# Patient Record
Sex: MALE | Race: WHITE
[De-identification: names, ages, dates, MRNs, and addresses within clinical notes are randomized per-mention and may not be internally consistent; named-entity substitution may affect disease eponyms.]

---

## 2018-10-29 ENCOUNTER — HOSPITAL ENCOUNTER (OUTPATIENT)
Dept: HOSPITAL 76 - SC | Age: 68
Discharge: HOME | End: 2018-10-29
Attending: INTERNAL MEDICINE
Payer: MEDICARE

## 2018-10-29 DIAGNOSIS — G47.33: Primary | ICD-10-CM

## 2018-10-29 PROCEDURE — 99212 OFFICE O/P EST SF 10 MIN: CPT

## 2018-10-29 PROCEDURE — 99213 OFFICE O/P EST LOW 20 MIN: CPT

## 2021-06-09 ENCOUNTER — HOSPITAL ENCOUNTER (OUTPATIENT)
Dept: HOSPITAL 76 - SC | Age: 71
Discharge: HOME | End: 2021-06-09
Attending: NURSE PRACTITIONER
Payer: MEDICARE

## 2021-06-09 DIAGNOSIS — G47.33: Primary | ICD-10-CM

## 2021-06-09 NOTE — SLEEP CARE CONSULTATION
Information from patient questionnaire entered by Juan Cuevas.





I have reviewed and concur with the information entered by Juan Cuevas. This 

document represents the service I personally performed and the decisions made by

me, Linda Sifuentes ARNP.





History of Present Illness


Service Date and Time: 2021    1642


Previous diagnosis: Mild, Obstructive Sleep Apnea-Hypopnea Syndrome


AHI: 6.6 (in 2017)


Reason for follow up: annual


Equipment type: CPAP


Equipment obtained from: Other ([employer]; getting supplies as 

needed)


Mask style: Full face


Backup mask available: Yes (old mask)


Last cushion change: rotates through several 


Prior sleep studies: Yes


Year and Where: 2017 - [facility]


HPI additional information: 





BUDDY FISHER was diagnosed to have mild, AHI 6.6, obstructive sleep apnea-

hypopnea syndrome and returns via Telehealth visit today for CPAP therapy annual

follow-up.





CPAP Compliance Data





- Data Reviewed with Patient


Average duration of nightly device use: 9 h 19 min


Compliance rate %: 99.4


Current pressure setting (cmH2O): 7-10


Humidity setting: 3


Heated hose settin


Average residual AHI: 1.1


Average large leak: 4 min 11 sec





Subjective


Patient concerns: reports: mask leak noise (occasional, just tightens headgear 

and it resolves), dry mouth, nose, throat (sometimes).  denies: aerophagia, mask

discomfort, air blowing in eyes, condensation in mask/hose, nasal congestion, 

epistaxis, other


Observed to snore while using device: No


Current pressure setting perceived as: comfortable


On therapy, patient: reports: sleeping better, awakening more refreshed, being 

more awake and alert during the day, more rested overall.  denies: drowsiness 

while driving


Initial Hernandez Sleepiness Scale score: 13 (in 2017)


Current Hernandez Sleepiness Scale score: 6





Allergies and Home Medications


Home medication list reviewed: Yes (no new meds)





Review of Systems


Review of systems same as previous: Yes (no changes)





Physical Exam


Vital signs obtained and entered by: Telehealth visit to reduce exposure during 

Covid pandemic


Height: 5 ft 7 in





Impression and Plan





1. Obstructive Sleep Apnea-Hypopnea Syndrome, mild, with good treatment 

compliance and good apnea control. On CPAP therapy, the patient has better sleep

quality and is more rested overall. Patient has been getting his supplies from a

mail order DME company.  He would like to see if he can find one closer to his 

area.  [facility]

. I discussed with him that we do have a list of DME's in the area and he 

requested a list of the be sent to him by mail.  He will let us know if he wants

transfer to one of those on his list. No other changes needed today.  He has no 

issues or concerns with skin irritation, aerophagia, epistaxis, nasal congestion

or significant oral dryness. Patient's apnea severity and rationale for 

treatment to reduce apnea, improve sleep quality and reduce cardiovascular and 

cerebrovascular events was reviewed. I also reviewed the benefit of consistent 

device use of CPAP for hypertension, cardiac disease,  and gastric reflux.





* Continue auto CPAP pressure at 7-10 cmH2O


* Notify me if snoring with mask or feeling that the pressure is too much or too

  little


* Attempt to lose weight


* Call this office if any problems using CPAP


* Return for follow up in 1 year, or sooner if concerns arise





Counseling Topics: Weight loss health impact


Visit Type: Telehealth Video


Video Type: VSee


Patient Location: Home


Location of Provider: Office


Patient agrees and consents to this telehealth visit type: Yes


Patient agrees to have their insurance billed: Yes


Time Spent with Patient (minutes): 18


Provider Statement: I spent 100% of the Telehealth Video Call with the patient 

with greater than 50% spent counseling the patient and coordination of care.

## 2022-07-08 ENCOUNTER — HOSPITAL ENCOUNTER (OUTPATIENT)
Dept: HOSPITAL 76 - SC | Age: 72
Discharge: HOME | End: 2022-07-08
Attending: NURSE PRACTITIONER
Payer: MEDICARE

## 2022-07-08 DIAGNOSIS — G47.33: Primary | ICD-10-CM

## 2022-07-08 DIAGNOSIS — E66.9: ICD-10-CM

## 2022-07-08 NOTE — SLEEP CARE CONSULTATION
Information from patient questionnaire entered by Kerri Mccabe MA.





I have reviewed and concur with the information entered by Kerri Mccabe MA. 

This document represents the service I personally performed and the decisions 

made by me, Linda Sifuentes ARNP.





History of Present Illness


Service Date and Time: 2022    1120


Previous diagnosis: Mild, Obstructive Sleep Apnea-Hypopnea Syndrome


AHI: 6.6 (in 2017)


Reason for follow up: annual (last seen 2021, wants RX new machine, )


Equipment type: CPAP


Equipment obtained from: Other ([employer]; getting supplies as 

needed)


Mask style: Full face


Mask brand: vocaltap & Carbonated Content


Backup mask available: Yes (other mask)


Last cushion change: circulates 14 masks every 2 weeks; started couple months 

ago


Prior sleep studies: Yes


Year and Where:  - [facility]


HPI additional information: 





BUDDY FISHER was diagnosed to have mild, AHI 6.6, obstructive sleep apnea-

hypopnea syndrome and returns via video telehealth visit today for CPAP therapy 

annual follow-up.





Sleep Study





- Results


Prior sleep studies: Yes


Year and Where: 2017 - [facility]





CPAP Compliance Data





- Data Reviewed with Patient


Average duration of nightly device use: 9 HOURS 26 MINUTES


Compliance rate %: 100 (90 days; 90/90 days used)


Current pressure setting (cmH2O): 7-10


Humidity setting: 3


Heated hose settin


Average residual AHI: 2.4


Average large leak: 33 SECONDS


Compliance data discussion: 





His machine was last updated on 2017.





Subjective


Patient concerns: reports: dry mouth, nose, throat (little bit).  denies: 

aerophagia, mask discomfort, air blowing in eyes, mask leak noise, condensation 

in mask/hose, nasal congestion, epistaxis, other


Observed to snore while using device: No


Current pressure setting perceived as: comfortable


On therapy, patient: reports: sleeping better, awakening more refreshed, being 

more awake and alert during the day, more rested overall.  denies: drowsiness 

while driving


Initial [place] Sleepiness Scale score: 13 (in 2017)


Current [place] Sleepiness Scale score: 11





Allergies and Home Medications


Home medication list reviewed: Yes (no changes)





Review of Systems


Review of systems same as previous: No (cyst removal last Wed)





Physical Exam


Vital signs obtained and entered by: DENISE MCCABE CMA AAMA


Height: 5 ft 7 in


Weight: 200 lb (pt reported)


Body Mass Index: 31.3


BMI Classification: Obese





Impression and Plan





1. Obstructive Sleep Apnea-Hypopnea Syndrome, mild, with good treatment 

compliance and good apnea control. On CPAP therapy, the patient has better sleep

quality and is more rested overall. Patient states he is having difficulty with 

an early awakening with inability to go back to sleep. He states about 3 hours 

after going to bed he will wake up to use the bathroom and then will lay in his 

bed for up to an hour trying to get back to sleep. He asked for any suggestions 

to help him get back to sleep at that time and denies any racing thoughts. He is

just laying there trying to go back to sleep.  I advised him to try getting out 

of bed and doing calming activities if not able to sleep after 15 to 20 minutes 

until sleepy. He may also try progressive relaxation by tensing and relaxing 

muscles from head to toe while concentrating on breathing which may help him 

fall back to sleep more easily. He states he will try these things and see if 

they will help him. Patient also asking about replacing his device since it is 

almost 5 years old before he leaves area for the winter months.  He last updated

his machine in 2017.  I told him he will be eligible in mid-September 

and if he will call at the first of the month I will write an order for a 

machine update before he leaves the area for the winter.  He voiced 

understanding and agreement with this plan of care. Patient's apnea severity and

rationale for treatment to reduce apnea, improve sleep quality and reduce 

cardiovascular and cerebrovascular events was reviewed. I also reviewed the 

benefit of consistent device use of CPAP for hypertension, cardiac disease  and 

gastric reflux.





* Continue auto CPAP pressure at 7-10 cmH2O


* Notify me if snoring with mask or feeling that the pressure is too much or too

  little


* Call this office if any problems using CPAP


* Return for follow up in 1 year, or sooner if concerns arise





Counseling Topics: Spare mask


Visit Type: Telehealth Video


Video Type: Doximity


Patient Location: Home


Location of Provider: Office


Patient agrees and consents to this telehealth visit type: Yes


Patient agrees to have their insurance billed: Yes


Time Spent with Patient (minutes): 23


Provider Statement: I spent 100% of the Telehealth Video Call with the patient 

with greater than 50% spent counseling the patient and coordination of care.

## 2023-10-05 ENCOUNTER — HOSPITAL ENCOUNTER (OUTPATIENT)
Dept: HOSPITAL 76 - SC | Age: 73
Discharge: HOME | End: 2023-10-05
Attending: NURSE PRACTITIONER
Payer: MEDICARE

## 2023-10-05 VITALS — DIASTOLIC BLOOD PRESSURE: 82 MMHG | SYSTOLIC BLOOD PRESSURE: 117 MMHG

## 2023-10-05 DIAGNOSIS — G47.33: Primary | ICD-10-CM

## 2023-10-05 DIAGNOSIS — E66.9: ICD-10-CM

## 2023-10-05 NOTE — SLEEP CARE CONSULTATION
Information from patient questionnaire entered by France Noonan.





I have reviewed and concur with the information entered by France Noonan. This 

document represents the service I personally performed and the decisions made by

me, Linda Sifuentes ARNP.





History of Present Illness


Service Date and Time: 10/05/2023    1300


Previous diagnosis: Mild, Obstructive Sleep Apnea-Hypopnea Syndrome


AHI: 6.6 (in 2017)


Reason for follow up: annual (LAST SEEN 07/2022)


Equipment type: CPAP (RESMED Airsense 11, s/u 10/2022)


Equipment obtained from: Other ([employer]; getting supplies as 

needed)


Mask style: Full face


Mask brand: [employer]ine[employer]ier (Simplus, medium cushion)


Backup mask available: Yes (old mask)


Last cushion change: last May, rotates through 14 cushions


Prior sleep studies: Yes


[facility]


HPI additional information: 





BUDDY FISHER was diagnosed to have mild, AHI 6.6, obstructive sleep apnea-

hypopnea syndrome and returns via video telehealth visit today for CPAP therapy 

annual follow-up.





Sleep Study





- Results


Prior sleep studies: Yes


[facility]





CPAP Compliance Data





- Data Reviewed with Patient


Average duration of nightly device use: 9 HRS 22 MINS


Compliance rate %: 95 (04/06/23-10/02/23; 172/180 days used)


Current pressure setting (cmH2O): 7-10


Average residual AHI: 0.9


Central apnea: 0


Obstructive apnea: 0.4


Hypopnea: 0.5


Average large leak: 0 L/min





Subjective


Missed days of use due to: reports: illness (oral surgery)


Patient concerns: reports: dry mouth, nose, throat (is a mouth breather).  

denies: aerophagia, mask discomfort, air blowing in eyes, mask leak noise, 

condensation in mask/hose, nasal congestion, epistaxis


Observed to snore while using device: Yes


Current pressure setting perceived as: comfortable


On therapy, patient: reports: sleeping better, awakening more refreshed, being 

more awake and alert during the day, more rested overall.  denies: drowsiness 

while driving


Initial [place] Sleepiness Scale score: 13 (in 2017)


Current [place] Sleepiness Scale score: 13 (10/05/23)





Allergies and Home Medications


Known drug allergies: No


Drug allergies reviewed: Yes


Home medication list reviewed: Yes (no changes)





Review of Systems


Review of systems same as previous: Yes (no changes)





Physical Exam


Vital signs obtained and entered by: FRANCE RAINEY MA


Blood Pressure: 117/82 (PER PT)


Height: 5 ft 7 in (PER PT)


Weight: 205 lb (PER PT)


Weight change since last visit: 5 lb gain?


Body Mass Index: 32.1


BMI Classification: Obese





Impression and Plan





1. Obstructive Sleep Apnea-Hypopnea Syndrome, mild, with good treatment 

compliance and good apnea control. On CPAP therapy, the patient has better sleep

quality and is more rested overall. Patient has significant improvement of their

sleep apnea and is satisfied with current CPAP therapy.  He states he does get 

some oral dryness but knows that he can breathe through his nose well and is 

sure this is part of the problem.  He did try oral moisturizers at one time but 

he states is really not a problem. We will update his supply prescription and 

see him next year for follow-up. Patient's apnea severity and rationale for 

treatment to reduce apnea, improve sleep quality and reduce cardiovascular and 

cerebrovascular events was reviewed. I also reviewed the benefit of consistent 

device use of CPAP for hypertension, cardiac disease and gastric reflux.





2. Obesity, unspecified. Currently patients BMI is 32.1.  Obesity increases the

risk of apnea, CPAP pressure requirements and overall health risks especially 

cardiovascular and diabetes. Thus patient is advised to lose weight. 





* Continue auto CPAP pressure at 7-10 cmH2O


* Update supply prescription


* Notify me if snoring with mask or feeling that the pressure is too much or too

  little


* Attempt to lose weight


* Call this office if any problems using CPAP


* Return for follow up in 1 year, or sooner if concerns arise





Counseling Topics: Spare mask, Weight loss health impact


Prescriptions: Device supplies


Visit Type: Telehealth Video


Video Type: [employer]


Patient Location: Home


Location of Provider: Office


Patient agrees and consents to this telehealth visit type: Yes


Patient agrees to have their insurance billed: Yes


Time Spent with Patient (minutes): 16


Provider Statement: I spent 100% of the Telehealth Video Call with the patient 

with greater than 50% spent counseling the patient and coordination of care.